# Patient Record
Sex: FEMALE | Race: WHITE | NOT HISPANIC OR LATINO | Employment: OTHER | URBAN - METROPOLITAN AREA
[De-identification: names, ages, dates, MRNs, and addresses within clinical notes are randomized per-mention and may not be internally consistent; named-entity substitution may affect disease eponyms.]

---

## 2017-08-22 ENCOUNTER — OFFICE VISIT (OUTPATIENT)
Dept: URGENT CARE | Facility: MEDICAL CENTER | Age: 57
End: 2017-08-22
Payer: COMMERCIAL

## 2017-08-22 PROCEDURE — 99203 OFFICE O/P NEW LOW 30 MIN: CPT

## 2019-08-08 ENCOUNTER — OFFICE VISIT (OUTPATIENT)
Dept: URGENT CARE | Facility: MEDICAL CENTER | Age: 59
End: 2019-08-08
Payer: COMMERCIAL

## 2019-08-08 VITALS
RESPIRATION RATE: 18 BRPM | SYSTOLIC BLOOD PRESSURE: 120 MMHG | HEART RATE: 108 BPM | OXYGEN SATURATION: 98 % | HEIGHT: 63 IN | TEMPERATURE: 98.2 F | DIASTOLIC BLOOD PRESSURE: 80 MMHG

## 2019-08-08 DIAGNOSIS — L23.7 POISON IVY DERMATITIS: Primary | ICD-10-CM

## 2019-08-08 PROCEDURE — 99213 OFFICE O/P EST LOW 20 MIN: CPT | Performed by: PHYSICIAN ASSISTANT

## 2019-08-08 RX ORDER — PREDNISONE 10 MG/1
10 TABLET ORAL 2 TIMES DAILY WITH MEALS
Qty: 34 TABLET | Refills: 0 | Status: SHIPPED | OUTPATIENT
Start: 2019-08-08 | End: 2019-08-18

## 2019-08-08 NOTE — PROGRESS NOTES
[facility]        NAME: Juvencio Bajwa is a 60 y o  female  : 1960    MRN: [id number]  DATE: 2019  TIME: 8:09 AM    Assessment and Plan   Poison ivy dermatitis [L23 7]  1  Poison ivy dermatitis  predniSONE 10 mg tablet         Patient Instructions     1  Take Prednisone 10mg  Tablets; 3 tablets twice daily x 3 days, then 2 tablets twice daily x 3 days, then 1 daily x 4 days  2  Benadryl as needed for itching  3  Follow up with PCP in 3-5 days if symptoms persist     Chief Complaint     Chief Complaint   Patient presents with   [facility]     started yesterday mainly on face around left eye , ear and neck   took Benadryl         History of Present Illness       Sanket Martinez is a 58-year-old female presents with a 1 day history of erythematous rash on her face, left cheek and neck regions  Patient states she was exposed to poison ivy 1 day prior but developed a rash earlier this morning  She denies any ocular changes, vesicles or drainage from the effected skin areas      Review of Systems   Review of Systems   Constitutional: Negative  HENT: Negative  Skin: Positive for rash  Current Medications       Current Outpatient Medications:     predniSONE 10 mg tablet, Take 1 tablet (10 mg total) by mouth 2 (two) times a day with meals for 10 days 3 PO BID x 3 days, then 2 PO BID x 3 days, then 1 daily x 4 days, Disp: 34 tablet, Rfl: 0    Current Allergies     Allergies as of 2019 - Reviewed 2019   Allergen Reaction Noted    Amoxicillin  2019    Penicillins  2019            The following portions of the patient's history were reviewed and updated as appropriate: allergies, current medications, past family history, past medical history, past social history, past surgical history and problem list      History reviewed  No pertinent past medical history  History reviewed  No pertinent surgical history  History reviewed   No pertinent family history  Medications have been verified  Objective   /80   Pulse (!) 108   Temp 98 2 °F (36 8 °C) (Temporal)   Resp 18   Ht 5' 3" (1 6 m)   SpO2 98%        Physical Exam     Physical Exam   Constitutional: She appears well-developed and well-nourished  No distress  HENT:   Head: Normocephalic and atraumatic  Right Ear: Tympanic membrane and ear canal normal    Left Ear: Tympanic membrane and ear canal normal    Nose: Nose normal    Mouth/Throat: Uvula is midline, oropharynx is clear and moist and mucous membranes are normal    Eyes: Conjunctivae, EOM and lids are normal    Cardiovascular: Normal rate, regular rhythm and normal heart sounds  No murmur heard    Pulmonary/Chest: Effort normal and breath sounds normal    Skin:

## 2019-08-08 NOTE — PATIENT INSTRUCTIONS
1  Take Prednisone 10mg  Tablets; 3 tablets twice daily x 3 days, then 2 tablets twice daily x 3 days, then 1 daily x 4 days  2  Benadryl as needed for itching  3   Follow up with PCP in 3-5 days if symptoms persist